# Patient Record
Sex: MALE | Race: WHITE | NOT HISPANIC OR LATINO
[De-identification: names, ages, dates, MRNs, and addresses within clinical notes are randomized per-mention and may not be internally consistent; named-entity substitution may affect disease eponyms.]

---

## 2019-06-12 PROBLEM — Z00.00 ENCOUNTER FOR PREVENTIVE HEALTH EXAMINATION: Status: ACTIVE | Noted: 2019-06-12

## 2019-06-26 ENCOUNTER — APPOINTMENT (OUTPATIENT)
Dept: ORTHOPEDIC SURGERY | Facility: CLINIC | Age: 54
End: 2019-06-26

## 2019-10-02 ENCOUNTER — APPOINTMENT (OUTPATIENT)
Dept: ORTHOPEDIC SURGERY | Facility: CLINIC | Age: 54
End: 2019-10-02
Payer: COMMERCIAL

## 2019-10-02 ENCOUNTER — TRANSCRIPTION ENCOUNTER (OUTPATIENT)
Age: 54
End: 2019-10-02

## 2019-10-02 DIAGNOSIS — M16.0 BILATERAL PRIMARY OSTEOARTHRITIS OF HIP: ICD-10-CM

## 2019-10-02 PROCEDURE — 99204 OFFICE O/P NEW MOD 45 MIN: CPT

## 2019-10-02 PROCEDURE — 73521 X-RAY EXAM HIPS BI 2 VIEWS: CPT

## 2019-10-02 NOTE — DISCUSSION/SUMMARY
[de-identified] : Patient has a clinical exam and history consistent with tight IT band on the right side appeared to be sent physical therapy twice week 5 weeks if pain persists patient does have a history of spinal stenosis and we may investigate this is possibly related to his chronic lumbar issues as well.

## 2019-10-02 NOTE — PHYSICAL EXAM
[de-identified] : Right hip has full passive internal and external rotation. Patient has tenderness in the area of the origin length and insertion of the right IT band. It is also noted that his right quad and VMO is much stronger and has larger size than his left [de-identified] : AP and lateral of both of her showed excellent position of the right hip replacement. No evidence of loosening or lysis where or failure.

## 2021-02-04 ENCOUNTER — RESULT REVIEW (OUTPATIENT)
Age: 56
End: 2021-02-04

## 2021-02-04 ENCOUNTER — APPOINTMENT (OUTPATIENT)
Dept: ORTHOPEDIC SURGERY | Facility: CLINIC | Age: 56
End: 2021-02-04
Payer: COMMERCIAL

## 2021-02-04 ENCOUNTER — OUTPATIENT (OUTPATIENT)
Dept: OUTPATIENT SERVICES | Facility: HOSPITAL | Age: 56
LOS: 1 days | End: 2021-02-04
Payer: COMMERCIAL

## 2021-02-04 VITALS
OXYGEN SATURATION: 97 % | TEMPERATURE: 97.9 F | SYSTOLIC BLOOD PRESSURE: 120 MMHG | HEIGHT: 70 IN | BODY MASS INDEX: 35.22 KG/M2 | HEART RATE: 76 BPM | DIASTOLIC BLOOD PRESSURE: 80 MMHG | WEIGHT: 246 LBS

## 2021-02-04 DIAGNOSIS — Z82.49 FAMILY HISTORY OF ISCHEMIC HEART DISEASE AND OTHER DISEASES OF THE CIRCULATORY SYSTEM: ICD-10-CM

## 2021-02-04 DIAGNOSIS — J30.2 OTHER SEASONAL ALLERGIC RHINITIS: ICD-10-CM

## 2021-02-04 DIAGNOSIS — Z01.818 ENCOUNTER FOR OTHER PREPROCEDURAL EXAMINATION: ICD-10-CM

## 2021-02-04 DIAGNOSIS — T84.010A BROKEN INTERNAL RIGHT HIP PROSTHESIS, INITIAL ENCOUNTER: ICD-10-CM

## 2021-02-04 DIAGNOSIS — I71.9 AORTIC ANEURYSM OF UNSPECIFIED SITE, W/OUT RUPTURE: ICD-10-CM

## 2021-02-04 DIAGNOSIS — N28.89 OTHER SPECIFIED DISORDERS OF KIDNEY AND URETER: ICD-10-CM

## 2021-02-04 DIAGNOSIS — Z82.3 FAMILY HISTORY OF STROKE: ICD-10-CM

## 2021-02-04 DIAGNOSIS — Z78.9 OTHER SPECIFIED HEALTH STATUS: ICD-10-CM

## 2021-02-04 DIAGNOSIS — T85.848A PAIN DUE TO OTHER INTERNAL PROSTHETIC DEVICES, IMPLANTS AND GRAFTS, INITIAL ENCOUNTER: ICD-10-CM

## 2021-02-04 DIAGNOSIS — Z86.69 PERSONAL HISTORY OF OTHER DISEASES OF THE NERVOUS SYSTEM AND SENSE ORGANS: ICD-10-CM

## 2021-02-04 DIAGNOSIS — D49.59 NEOPLASM UNSPECIFIED BEHAVIOR OF OTHER GENITOURINARY ORGAN: ICD-10-CM

## 2021-02-04 DIAGNOSIS — Z87.19 PERSONAL HISTORY OF OTHER DISEASES OF THE DIGESTIVE SYSTEM: ICD-10-CM

## 2021-02-04 LAB
ALBUMIN SERPL ELPH-MCNC: 4.8 G/DL — SIGNIFICANT CHANGE UP (ref 3.3–5)
ALP SERPL-CCNC: 72 U/L — SIGNIFICANT CHANGE UP (ref 40–120)
ALT FLD-CCNC: 21 U/L — SIGNIFICANT CHANGE UP (ref 10–45)
ANION GAP SERPL CALC-SCNC: 16 MMOL/L — SIGNIFICANT CHANGE UP (ref 5–17)
APPEARANCE UR: CLEAR — SIGNIFICANT CHANGE UP
APTT BLD: 36.9 SEC — HIGH (ref 27.5–35.5)
AST SERPL-CCNC: 24 U/L — SIGNIFICANT CHANGE UP (ref 10–40)
BASOPHILS # BLD AUTO: 0.01 K/UL — SIGNIFICANT CHANGE UP (ref 0–0.2)
BASOPHILS NFR BLD AUTO: 0.1 % — SIGNIFICANT CHANGE UP (ref 0–2)
BILIRUB SERPL-MCNC: 0.4 MG/DL — SIGNIFICANT CHANGE UP (ref 0.2–1.2)
BILIRUB UR-MCNC: NEGATIVE — SIGNIFICANT CHANGE UP
BUN SERPL-MCNC: 22 MG/DL — SIGNIFICANT CHANGE UP (ref 7–23)
CALCIUM SERPL-MCNC: 9.7 MG/DL — SIGNIFICANT CHANGE UP (ref 8.4–10.5)
CHLORIDE SERPL-SCNC: 107 MMOL/L — SIGNIFICANT CHANGE UP (ref 96–108)
CO2 SERPL-SCNC: 27 MMOL/L — SIGNIFICANT CHANGE UP (ref 22–31)
COLOR SPEC: YELLOW — SIGNIFICANT CHANGE UP
CREAT SERPL-MCNC: 1.07 MG/DL — SIGNIFICANT CHANGE UP (ref 0.5–1.3)
DIFF PNL FLD: NEGATIVE — SIGNIFICANT CHANGE UP
EOSINOPHIL # BLD AUTO: 0.08 K/UL — SIGNIFICANT CHANGE UP (ref 0–0.5)
EOSINOPHIL NFR BLD AUTO: 1.1 % — SIGNIFICANT CHANGE UP (ref 0–6)
GLUCOSE SERPL-MCNC: 92 MG/DL — SIGNIFICANT CHANGE UP (ref 70–99)
GLUCOSE UR QL: NEGATIVE — SIGNIFICANT CHANGE UP
HCT VFR BLD CALC: 47.2 % — SIGNIFICANT CHANGE UP (ref 39–50)
HGB BLD-MCNC: 14.8 G/DL — SIGNIFICANT CHANGE UP (ref 13–17)
IMM GRANULOCYTES NFR BLD AUTO: 0.1 % — SIGNIFICANT CHANGE UP (ref 0–1.5)
INR BLD: 1.05 — SIGNIFICANT CHANGE UP (ref 0.88–1.16)
KETONES UR-MCNC: ABNORMAL MG/DL
LEUKOCYTE ESTERASE UR-ACNC: NEGATIVE — SIGNIFICANT CHANGE UP
LYMPHOCYTES # BLD AUTO: 1.96 K/UL — SIGNIFICANT CHANGE UP (ref 1–3.3)
LYMPHOCYTES # BLD AUTO: 25.9 % — SIGNIFICANT CHANGE UP (ref 13–44)
MCHC RBC-ENTMCNC: 30.2 PG — SIGNIFICANT CHANGE UP (ref 27–34)
MCHC RBC-ENTMCNC: 31.4 GM/DL — LOW (ref 32–36)
MCV RBC AUTO: 96.3 FL — SIGNIFICANT CHANGE UP (ref 80–100)
MONOCYTES # BLD AUTO: 0.77 K/UL — SIGNIFICANT CHANGE UP (ref 0–0.9)
MONOCYTES NFR BLD AUTO: 10.2 % — SIGNIFICANT CHANGE UP (ref 2–14)
NEUTROPHILS # BLD AUTO: 4.74 K/UL — SIGNIFICANT CHANGE UP (ref 1.8–7.4)
NEUTROPHILS NFR BLD AUTO: 62.6 % — SIGNIFICANT CHANGE UP (ref 43–77)
NITRITE UR-MCNC: NEGATIVE — SIGNIFICANT CHANGE UP
NRBC # BLD: 0 /100 WBCS — SIGNIFICANT CHANGE UP (ref 0–0)
PH UR: 5 — SIGNIFICANT CHANGE UP (ref 5–8)
PLATELET # BLD AUTO: 310 K/UL — SIGNIFICANT CHANGE UP (ref 150–400)
POTASSIUM SERPL-MCNC: 4.9 MMOL/L — SIGNIFICANT CHANGE UP (ref 3.5–5.3)
POTASSIUM SERPL-SCNC: 4.9 MMOL/L — SIGNIFICANT CHANGE UP (ref 3.5–5.3)
PROT SERPL-MCNC: 7.2 G/DL — SIGNIFICANT CHANGE UP (ref 6–8.3)
PROT UR-MCNC: NEGATIVE MG/DL — SIGNIFICANT CHANGE UP
PROTHROM AB SERPL-ACNC: 12.6 SEC — SIGNIFICANT CHANGE UP (ref 10.6–13.6)
RBC # BLD: 4.9 M/UL — SIGNIFICANT CHANGE UP (ref 4.2–5.8)
RBC # FLD: 12.3 % — SIGNIFICANT CHANGE UP (ref 10.3–14.5)
SODIUM SERPL-SCNC: 150 MMOL/L — HIGH (ref 135–145)
SP GR SPEC: >=1.03 — SIGNIFICANT CHANGE UP (ref 1–1.03)
UROBILINOGEN FLD QL: 0.2 E.U./DL — SIGNIFICANT CHANGE UP
WBC # BLD: 7.57 K/UL — SIGNIFICANT CHANGE UP (ref 3.8–10.5)
WBC # FLD AUTO: 7.57 K/UL — SIGNIFICANT CHANGE UP (ref 3.8–10.5)

## 2021-02-04 PROCEDURE — 87086 URINE CULTURE/COLONY COUNT: CPT

## 2021-02-04 PROCEDURE — 99214 OFFICE O/P EST MOD 30 MIN: CPT

## 2021-02-04 PROCEDURE — 99214 OFFICE O/P EST MOD 30 MIN: CPT | Mod: 25

## 2021-02-04 PROCEDURE — 93005 ELECTROCARDIOGRAM TRACING: CPT

## 2021-02-04 PROCEDURE — 99072 ADDL SUPL MATRL&STAF TM PHE: CPT

## 2021-02-04 PROCEDURE — 93010 ELECTROCARDIOGRAM REPORT: CPT

## 2021-02-04 PROCEDURE — 71046 X-RAY EXAM CHEST 2 VIEWS: CPT

## 2021-02-04 PROCEDURE — 80053 COMPREHEN METABOLIC PANEL: CPT

## 2021-02-04 PROCEDURE — 73521 X-RAY EXAM HIPS BI 2 VIEWS: CPT

## 2021-02-04 PROCEDURE — 99024 POSTOP FOLLOW-UP VISIT: CPT

## 2021-02-04 PROCEDURE — 81003 URINALYSIS AUTO W/O SCOPE: CPT

## 2021-02-04 PROCEDURE — 85025 COMPLETE CBC W/AUTO DIFF WBC: CPT

## 2021-02-04 PROCEDURE — 85610 PROTHROMBIN TIME: CPT

## 2021-02-04 PROCEDURE — 71046 X-RAY EXAM CHEST 2 VIEWS: CPT | Mod: 26

## 2021-02-04 PROCEDURE — 85730 THROMBOPLASTIN TIME PARTIAL: CPT

## 2021-02-04 RX ORDER — ACETAMINOPHEN AND CODEINE 300; 30 MG/1; MG/1
300-30 TABLET ORAL 3 TIMES DAILY
Qty: 30 | Refills: 0 | Status: ACTIVE | COMMUNITY
Start: 2021-02-04 | End: 1900-01-01

## 2021-02-04 RX ORDER — ALPRAZOLAM 2 MG/1
TABLET ORAL
Refills: 0 | Status: ACTIVE | COMMUNITY

## 2021-02-04 RX ORDER — GABAPENTIN 300 MG
300 TABLET ORAL
Refills: 0 | Status: ACTIVE | COMMUNITY

## 2021-02-04 RX ORDER — FAMOTIDINE 20 MG/1
20 TABLET, FILM COATED ORAL
Refills: 0 | Status: ACTIVE | COMMUNITY

## 2021-02-04 RX ORDER — LOSARTAN POTASSIUM 50 MG/1
50 TABLET, FILM COATED ORAL
Refills: 0 | Status: ACTIVE | COMMUNITY

## 2021-02-04 NOTE — DISCUSSION/SUMMARY
[de-identified] : Patient had a long discussion today he is clearly had what appears to be a traumatic rupture of the polyethylene. This is quite aware of that have not witnessed such an event in the past 22 years using this implant type. Review of the operative report indicates a +410° polyethylene liner mated to a metal nonceramic head.I discussed these findings and patient care with Dr. Nathanael Wren 3 was a revision specialist. Dr. QUIROZ has decided and agreed to see the patient in consultation in the next day or 2 and we'll potentially plan revision surgery next week.

## 2021-02-04 NOTE — PHYSICAL EXAM
[No Acute Distress] : no acute distress [Well Nourished] : well nourished [Well Developed] : well developed [Well-Appearing] : well-appearing [Normal Sclera/Conjunctiva] : normal sclera/conjunctiva [PERRL] : pupils equal round and reactive to light [EOMI] : extraocular movements intact [Normal Outer Ear/Nose] : the outer ears and nose were normal in appearance [Normal Oropharynx] : the oropharynx was normal [No JVD] : no jugular venous distention [No Lymphadenopathy] : no lymphadenopathy [Supple] : supple [Thyroid Normal, No Nodules] : the thyroid was normal and there were no nodules present [No Respiratory Distress] : no respiratory distress  [No Accessory Muscle Use] : no accessory muscle use [Clear to Auscultation] : lungs were clear to auscultation bilaterally [Normal Rate] : normal rate  [Regular Rhythm] : with a regular rhythm [Normal S1, S2] : normal S1 and S2 [No Murmur] : no murmur heard [No Carotid Bruits] : no carotid bruits [No Abdominal Bruit] : a ~M bruit was not heard ~T in the abdomen [No Varicosities] : no varicosities [Pedal Pulses Present] : the pedal pulses are present [No Edema] : there was no peripheral edema [No Palpable Aorta] : no palpable aorta [No Extremity Clubbing/Cyanosis] : no extremity clubbing/cyanosis [Soft] : abdomen soft [Non Tender] : non-tender [Non-distended] : non-distended [No Masses] : no abdominal mass palpated [No HSM] : no HSM [Normal Bowel Sounds] : normal bowel sounds [Normal Posterior Cervical Nodes] : no posterior cervical lymphadenopathy [Normal Anterior Cervical Nodes] : no anterior cervical lymphadenopathy [No CVA Tenderness] : no CVA  tenderness [No Spinal Tenderness] : no spinal tenderness [Grossly Normal Strength/Tone] : grossly normal strength/tone [No Rash] : no rash [Coordination Grossly Intact] : coordination grossly intact [No Focal Deficits] : no focal deficits [Normal Gait] : normal gait [Deep Tendon Reflexes (DTR)] : deep tendon reflexes were 2+ and symmetric [Normal Affect] : the affect was normal [Normal Insight/Judgement] : insight and judgment were intact

## 2021-02-04 NOTE — HISTORY OF PRESENT ILLNESS
[de-identified] : Patient is here with a new complaint. Patient developed a squeaking noise and sensation his right hip status post fall 3 days ago. Patient prior to that and doing quite well status post may 2015 right hip replacement. Patient had an uncomplicated postop course actually had a repeat radiograph in November of 2020 and ultimately had a diagnosis of trochanteric bursitis/ITB tightness of the right hip. Patient presents today with pain in his thigh and also a squeaking sensation while angulated.

## 2021-02-04 NOTE — PHYSICAL EXAM
[de-identified] : Patient no discernible leg length discrepancy with passive internal/external rotation of the right hip at 90° there is no restriction no audible squeaking today. There is also no audible squeaking with patient ambulating in the office. No sense of grinding with rotation either. He is neurovascularly intact distally. [de-identified] : AP and lateral of the right hip were obtained and compared to radiographs from November 2020 and 2015. There is no interval change since November 2023 teslas shows an acentric femoral head component. This is visible both in the AP and lateral view.

## 2021-02-05 RX ORDER — KETOCONAZOLE 20 MG/G
2 CREAM TOPICAL
Qty: 15 | Refills: 0 | Status: ACTIVE | COMMUNITY
Start: 2020-12-30

## 2021-02-05 RX ORDER — GABAPENTIN 300 MG/1
300 CAPSULE ORAL
Qty: 270 | Refills: 0 | Status: ACTIVE | COMMUNITY
Start: 2021-01-07

## 2021-02-05 RX ORDER — GABAPENTIN 100 MG/1
100 CAPSULE ORAL
Qty: 90 | Refills: 0 | Status: ACTIVE | COMMUNITY
Start: 2020-12-10

## 2021-02-05 RX ORDER — SILDENAFIL 100 MG/1
100 TABLET, FILM COATED ORAL
Qty: 12 | Refills: 0 | Status: ACTIVE | COMMUNITY
Start: 2021-01-26

## 2021-02-05 RX ORDER — ALBUTEROL SULFATE 90 UG/1
108 (90 BASE) INHALANT RESPIRATORY (INHALATION)
Qty: 8 | Refills: 0 | Status: ACTIVE | COMMUNITY
Start: 2021-01-26

## 2021-02-05 RX ORDER — ALPRAZOLAM 1 MG/1
1 TABLET ORAL
Qty: 30 | Refills: 0 | Status: ACTIVE | COMMUNITY
Start: 2021-01-20

## 2021-02-05 RX ORDER — NAPROXEN 500 MG/1
500 TABLET ORAL
Qty: 180 | Refills: 0 | Status: ACTIVE | COMMUNITY
Start: 2021-01-25

## 2021-02-05 NOTE — HISTORY OF PRESENT ILLNESS
[No Pertinent Pulmonary History] : no history of asthma, COPD, sleep apnea, or smoking [No Adverse Anesthesia Reaction] : no adverse anesthesia reaction in self or family member [(Patient denies any chest pain, claudication, dyspnea on exertion, orthopnea, palpitations or syncope)] : Patient denies any chest pain, claudication, dyspnea on exertion, orthopnea, palpitations or syncope [Moderate (4-6 METs)] : Moderate (4-6 METs) [Aortic Stenosis] : no aortic stenosis [Atrial Fibrillation] : no atrial fibrillation [Coronary Artery Disease] : no coronary artery disease [Recent Myocardial Infarction] : no recent myocardial infarction [Implantable Device/Pacemaker] : no implantable device/pacemaker [Chronic Anticoagulation] : no chronic anticoagulation [Chronic Kidney Disease] : no chronic kidney disease [Diabetes] : no diabetes [FreeTextEntry1] : Right Total Hip Revision Arthroplasty [FreeTextEntry2] : 2/8/2021 [FreeTextEntry3] : Dr. Nathanael Wren [FreeTextEntry4] : The patient is a 56 year old man presenting with complications after fall s/p right total hip arthroplasty . The patient is planned for right total hip revision arthroplasty with Dr. Nathanael Wren on 2/8/2021\par \par The patient denies recent/active cardiopulmonary symptoms including chest pain, shortness of breath, dyspnea of exertion, palpitations, swelling, headache, dizziness, syncope. \par \par He has a history of stable, asymptomatic aortic aneurysm, last measured at 4.5cm.\par \par  [FreeTextEntry5] : Neuropathy, Stable Aortic Aneurysm ~4.5cm

## 2021-02-05 NOTE — PHYSICAL EXAM
[de-identified] : General appearance: well nourished and hydrated, pleasant, alert and oriented x 3, cooperative.  Obese habitus, mostly central.\par HEENT: normocephalic, EOM intact, wearing mask, external auditory canal clear.  \par Cardiovascular: no lower leg edema, no varicosities, dorsalis pedis pulses palpable and symmetric.  \par Lymphatics: no palpable lymphadenopathy, no lymphedema.  \par Neurologic: sensation is normal, no muscle weakness in upper or lower extremities, patella tendon reflexes present and symmetric.  \par Dermatologic: skin moist, warm, no rash.  \par Spine: cervical spine with normal lordosis and painless range of motion, thoracic spine with normal kyphosis and painless range of motion, lumbosacral spine with normal lordosis and painless range of motion.\par Gait: ambulating with one crutch, some right coxalgia.\par \par Limb lengths: RLE about 1-2mm longer than LLE\par \par Right hip:\par - Skin intact, healed anterior incision noted\par - Mild swelling\par - Mild generalized hip and proximal thigh tenderness on palpation\par - ROM: hip was able to be ranged from 0-45 flexion, 10/10 ADD/ABD, 15/15 IR/ER; further ROM was not tested. Motion in all planes was painful.\par - Nabil negative\par - Stinchfield deferred\par - Flexor power 4+/5\par - Abductor power 4+/5 [de-identified] : Imaging was reviewed from [facility]. Oct 2019 hip films demonstrate cementless ELIANE in good position without evidence of complication. 2/4/21 films demonstrate sudden appearance of superior eccentric polyethylene wear suggestive of polyethylene fracture vs. extrusion. Femoral and acetabular components appear to be well fixed without evidence of loosening. No periprosthetic fracture is visualized.

## 2021-02-05 NOTE — HISTORY OF PRESENT ILLNESS
[___ days] : [unfilled] day(s) ago [9] : a current pain level of 9/10 [Constant] : ~He/She~ states the symptoms seem to be constant [Rest] : relieved by rest [de-identified] : 57y/o male referred by Dr. 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 He underwent an uncomplicated right ELIANE in 2015 (direct anterior approach) and did well postoperatively; mild thigh pain was present which did not limit his activities of daily living or his ability to work ([employer], [employer]). However, he had a mechanical ground level fall on 2/1/21 and noted sharply increased right hip and thigh pain as well as a squeaking sensation with ambulation. Subsequent followup with Dr. Lin revealed radiographic evidence of acute acetabular polyethylene fracture vs. extrusion. He was referred to me for definitive surgical management. He has been mostly compliant with PWB on crutches since seeing Dr. Lin. Denies any other injuries or new pain since the fall. [Walking] : worsened by walking [de-identified] : describes achy and burning gpain

## 2021-02-05 NOTE — ADDENDUM
[FreeTextEntry1] : Labs/EKG/Imaging screen reviewed.  \par \par EKG with NSR with RBBB.  No evidence of acute ischemic changes.\par \par Chem-7 with serum sodium 150.  Urinalysis with high specific gravity, likely indicating some volume depletion.  Patient instructed on reducing solute intake, increasing free water intake.  He can repeat bloodwork as outpatient with PMD.\par \par PENDING COVID SCREEN, Patient may proceed with scheduled surgery.\par

## 2021-02-05 NOTE — DISCUSSION/SUMMARY
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 I discussed the surgical possibilities with him. At the least, he will require exchange of the head and liner. If the locking mechanism for the acetabular shell has been damaged, he will also need revision of the cup. If access to the cup is impeded by the femoral component, all-component revision may be necessary. He understands. After discussion of the risks, benefits,and alternatives to surgery, he agreed to move forward with surgery.\par - CT right hip to assess for subtle loosening or periprosthetic fracture (given the increased thigh pain)\par - Book for surgery 2/12/21 pending medical clearance

## 2021-02-05 NOTE — ASSESSMENT
[Patient Optimized for Surgery] : Patient optimized for surgery [No Further Testing Recommended] : no further testing recommended [As per surgery] : as per surgery [FreeTextEntry4] : The patient is a 56 year old man presenting with complications after fall s/p right total hip arthroplasty . The patient is planned for right total hip revision arthroplasty with Dr. Nathanael Wren on 2/8/2021\par \par \par -Labs/Diagnostics reviewed with patient in detail\par -METS >4-6\par -RCRI = 0, 3.9% 30-day risk of death/MI/cardiac arrest\par \par Patient is at low risk for moderate risk surgery/procedure\par \par Pending labs/diagnostics, Patient is MEDICALLY OPTIMIZED TO PROCEED WITH PROPOSED SURGERY.\par \par The risks/benefits/potential complications of diagnostic testing/treatments were described in detail.\par \par **Case discussed with patient's primary care physician and specialist(s).\par \par \par

## 2021-02-06 LAB
CULTURE RESULTS: NO GROWTH — SIGNIFICANT CHANGE UP
SPECIMEN SOURCE: SIGNIFICANT CHANGE UP

## 2021-02-08 RX ORDER — ACETAMINOPHEN AND CODEINE 300; 30 MG/1; MG/1
300-30 TABLET ORAL 3 TIMES DAILY
Qty: 30 | Refills: 0 | Status: ACTIVE | COMMUNITY
Start: 2021-02-08 | End: 1900-01-01

## 2021-02-11 ENCOUNTER — TRANSCRIPTION ENCOUNTER (OUTPATIENT)
Age: 56
End: 2021-02-11

## 2021-02-11 VITALS
SYSTOLIC BLOOD PRESSURE: 125 MMHG | RESPIRATION RATE: 16 BRPM | OXYGEN SATURATION: 97 % | TEMPERATURE: 98 F | DIASTOLIC BLOOD PRESSURE: 81 MMHG | WEIGHT: 246.92 LBS | HEIGHT: 70 IN

## 2021-02-11 RX ORDER — GABAPENTIN 400 MG/1
1 CAPSULE ORAL
Qty: 0 | Refills: 0 | DISCHARGE

## 2021-02-11 RX ORDER — POVIDONE-IODINE 5 %
1 AEROSOL (ML) TOPICAL ONCE
Refills: 0 | Status: COMPLETED | OUTPATIENT
Start: 2021-02-12 | End: 2021-02-12

## 2021-02-11 NOTE — H&P ADULT - NSICDXPASTSURGICALHX_GEN_ALL_CORE_FT
PAST SURGICAL HISTORY:  Elective surgery for purposes other than treating health conditions scar revision, neck    Elective surgery for purposes other than treating health conditions tendon transfer    Elective surgery for purposes other than treating health conditions right juglar    Elective surgery for purposes other than treating health conditions nerve graft in arm x2    Elective surgery for purposes other than treating health conditions orchiectomy right    Other postprocedural status S/P rotator cuff surgery    Other postprocedural status multiple surgeries neck, s/p trauma [id number]    Other postprocedural status T3    Other postprocedural status right wrist    Other postprocedural status 1983     PAST SURGICAL HISTORY:  Elective surgery for purposes other than treating health conditions scar revision, neck    Elective surgery for purposes other than treating health conditions tendon transfer    Elective surgery for purposes other than treating health conditions right juglar    Elective surgery for purposes other than treating health conditions nerve graft in arm x2    Elective surgery for purposes other than treating health conditions orchiectomy right    Other postprocedural status S/P rotator cuff surgery, left    Other postprocedural status multiple surgeries neck, s/p trauma [id number]    Other postprocedural status 1983    Other postprocedural status T3, bone taken out    Other postprocedural status right wrist, 2013    Surgery, elective right hip replacement    Surgery, elective hernia repair, right, 2018    Surgery, elective right kidney mass removal, 2014

## 2021-02-11 NOTE — H&P ADULT - PROBLEM SELECTOR PLAN 1
Admit to Orthopaedic Service.  Presents today for elective right total hip revision   Pt medically stable and cleared for procedure today by  Admit to Orthopaedic Service.  Presents today for elective right total hip revision   Pt medically stable and cleared for procedure today by Dr. Davalos

## 2021-02-11 NOTE — H&P ADULT - NSHPLABSRESULTS_GEN_ALL_CORE
covid pcr negative on 2/9/21  Povidone iodine nasal swab to be given day of surgery covid pcr negative on 2/9/21  Povidone iodine nasal swab to be given day of surgery  preop cbc/cmp/pt/inr/ptt - within normal limits, reviewed by medical clearance   Cr 1.07 on 2/4/21   ekg - nsr, RBBB, reviewed by medical clearance  CXR: Within normal limits, per medical clearance.

## 2021-02-11 NOTE — H&P ADULT - NSHPPHYSICALEXAM_GEN_ALL_CORE
MSK:   decreased ROM right hip 2/2 pain  Remainder of physical exam as per medical clearance note General: NAD, alert and oriented  MSK: Right LE incision intact s/p thr, SLT decreased on right lateral region of shin, intact left le. DP/PT 2+, EHL/TA/GS 5/5.   decreased ROM right hip 2/2 pain  Remainder of physical exam as per medical clearance note

## 2021-02-11 NOTE — PRE-OP CHECKLIST - SELECT TESTS ORDERED
urine C&S, Covid negative 2/9/BMP/CBC/PT/PTT/Urinalysis/EKG/CXR urine C&S, Covid negative 2/9/BMP/CBC/PT/PTT/Urinalysis/EKG/CXR/COVID

## 2021-02-11 NOTE — H&P ADULT - HISTORY OF PRESENT ILLNESS
56M with right hip pain x   Presents for elective right total hip revision  56M with right hip pain for several weeks. Pt. had RIGHT THR done with Dr. Lin in 2015. Pt. states after surgery he never felt 100% and was told he had a tight ITband. One week ago, pt. states he doesn't know if he slipped 2/2 to his hip vs jammed his leg and injured hip. Pt. started to hear a squeaky noise in right hip and started to have increased pain. Pt. set up appointment with Dr. Lin, had xrays done which showed "metal on metal and ball is misplaced." Pt. has been using crutches, nwb on right le x 2w. Denies any numbness/tinging in b/l les.   Presents for elective right total hip revision.

## 2021-02-11 NOTE — PATIENT PROFILE ADULT - NSPROIMPLANTSMEDDEV_GEN_A_NUR
right hip prothesis/Prosthetic device(s) right hip prothesis  mesh in right groin, s/p hernia repair/Prosthetic device(s)
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
## 2021-02-12 ENCOUNTER — APPOINTMENT (OUTPATIENT)
Dept: ORTHOPEDIC SURGERY | Facility: HOSPITAL | Age: 56
End: 2021-02-12

## 2021-02-12 ENCOUNTER — TRANSCRIPTION ENCOUNTER (OUTPATIENT)
Age: 56
End: 2021-02-12

## 2021-02-12 ENCOUNTER — RESULT REVIEW (OUTPATIENT)
Age: 56
End: 2021-02-12

## 2021-02-12 ENCOUNTER — INPATIENT (INPATIENT)
Facility: HOSPITAL | Age: 56
LOS: 0 days | Discharge: HOME HEALTH | DRG: 468 | End: 2021-02-13
Attending: ORTHOPAEDIC SURGERY | Admitting: ORTHOPAEDIC SURGERY
Payer: COMMERCIAL

## 2021-02-12 DIAGNOSIS — Z41.9 ENCOUNTER FOR PROCEDURE FOR PURPOSES OTHER THAN REMEDYING HEALTH STATE, UNSPECIFIED: Chronic | ICD-10-CM

## 2021-02-12 DIAGNOSIS — I10 ESSENTIAL (PRIMARY) HYPERTENSION: ICD-10-CM

## 2021-02-12 DIAGNOSIS — M25.551 PAIN IN RIGHT HIP: ICD-10-CM

## 2021-02-12 LAB
GRAM STN FLD: SIGNIFICANT CHANGE UP
SPECIMEN SOURCE: SIGNIFICANT CHANGE UP

## 2021-02-12 PROCEDURE — 72170 X-RAY EXAM OF PELVIS: CPT | Mod: 26

## 2021-02-12 PROCEDURE — 27134 REVISE HIP JOINT REPLACEMENT: CPT | Mod: 52,RT

## 2021-02-12 PROCEDURE — 88305 TISSUE EXAM BY PATHOLOGIST: CPT | Mod: 26

## 2021-02-12 RX ORDER — ALPRAZOLAM 0.25 MG
1 TABLET ORAL THREE TIMES A DAY
Refills: 0 | Status: DISCONTINUED | OUTPATIENT
Start: 2021-02-12 | End: 2021-02-13

## 2021-02-12 RX ORDER — FAMOTIDINE 10 MG/ML
1 INJECTION INTRAVENOUS
Qty: 0 | Refills: 0 | DISCHARGE

## 2021-02-12 RX ORDER — CHLORHEXIDINE GLUCONATE 213 G/1000ML
1 SOLUTION TOPICAL ONCE
Refills: 0 | Status: COMPLETED | OUTPATIENT
Start: 2021-02-12 | End: 2021-02-12

## 2021-02-12 RX ORDER — GABAPENTIN 400 MG/1
1 CAPSULE ORAL
Qty: 0 | Refills: 0 | DISCHARGE

## 2021-02-12 RX ORDER — OXYCODONE HYDROCHLORIDE 5 MG/1
5 TABLET ORAL EVERY 4 HOURS
Refills: 0 | Status: DISCONTINUED | OUTPATIENT
Start: 2021-02-12 | End: 2021-02-13

## 2021-02-12 RX ORDER — HYDROMORPHONE HYDROCHLORIDE 2 MG/ML
0.5 INJECTION INTRAMUSCULAR; INTRAVENOUS; SUBCUTANEOUS
Refills: 0 | Status: DISCONTINUED | OUTPATIENT
Start: 2021-02-12 | End: 2021-02-13

## 2021-02-12 RX ORDER — CEFPODOXIME PROXETIL 100 MG
200 TABLET ORAL EVERY 12 HOURS
Refills: 0 | Status: DISCONTINUED | OUTPATIENT
Start: 2021-02-13 | End: 2021-02-13

## 2021-02-12 RX ORDER — SENNA PLUS 8.6 MG/1
2 TABLET ORAL AT BEDTIME
Refills: 0 | Status: DISCONTINUED | OUTPATIENT
Start: 2021-02-12 | End: 2021-02-13

## 2021-02-12 RX ORDER — ASPIRIN 81 MG/1
81 TABLET ORAL
Qty: 60 | Refills: 0 | Status: ACTIVE | COMMUNITY
Start: 2021-02-12 | End: 1900-01-01

## 2021-02-12 RX ORDER — LOSARTAN POTASSIUM 100 MG/1
1 TABLET, FILM COATED ORAL
Qty: 0 | Refills: 0 | DISCHARGE

## 2021-02-12 RX ORDER — OXYCODONE HYDROCHLORIDE 5 MG/1
10 TABLET ORAL EVERY 4 HOURS
Refills: 0 | Status: DISCONTINUED | OUTPATIENT
Start: 2021-02-12 | End: 2021-02-13

## 2021-02-12 RX ORDER — FOLIC ACID 0.8 MG
1 TABLET ORAL DAILY
Refills: 0 | Status: DISCONTINUED | OUTPATIENT
Start: 2021-02-12 | End: 2021-02-13

## 2021-02-12 RX ORDER — BUPIVACAINE 13.3 MG/ML
20 INJECTION, SUSPENSION, LIPOSOMAL INFILTRATION ONCE
Refills: 0 | Status: DISCONTINUED | OUTPATIENT
Start: 2021-02-12 | End: 2021-02-13

## 2021-02-12 RX ORDER — GABAPENTIN 400 MG/1
300 CAPSULE ORAL DAILY
Refills: 0 | Status: DISCONTINUED | OUTPATIENT
Start: 2021-02-12 | End: 2021-02-13

## 2021-02-12 RX ORDER — MAGNESIUM HYDROXIDE 400 MG/1
30 TABLET, CHEWABLE ORAL DAILY
Refills: 0 | Status: DISCONTINUED | OUTPATIENT
Start: 2021-02-12 | End: 2021-02-13

## 2021-02-12 RX ORDER — CEFAZOLIN SODIUM 1 G
2000 VIAL (EA) INJECTION EVERY 8 HOURS
Refills: 0 | Status: COMPLETED | OUTPATIENT
Start: 2021-02-12 | End: 2021-02-12

## 2021-02-12 RX ORDER — ACETAMINOPHEN 500 MG/1
500 TABLET ORAL
Qty: 180 | Refills: 1 | Status: ACTIVE | COMMUNITY
Start: 2021-02-12 | End: 1900-01-01

## 2021-02-12 RX ORDER — ASPIRIN/CALCIUM CARB/MAGNESIUM 324 MG
81 TABLET ORAL
Refills: 0 | Status: DISCONTINUED | OUTPATIENT
Start: 2021-02-13 | End: 2021-02-13

## 2021-02-12 RX ORDER — CELECOXIB 200 MG/1
400 CAPSULE ORAL ONCE
Refills: 0 | Status: COMPLETED | OUTPATIENT
Start: 2021-02-12 | End: 2021-02-12

## 2021-02-12 RX ORDER — ACETAMINOPHEN 500 MG
975 TABLET ORAL EVERY 8 HOURS
Refills: 0 | Status: DISCONTINUED | OUTPATIENT
Start: 2021-02-12 | End: 2021-02-13

## 2021-02-12 RX ORDER — SODIUM CHLORIDE 9 MG/ML
1000 INJECTION, SOLUTION INTRAVENOUS
Refills: 0 | Status: DISCONTINUED | OUTPATIENT
Start: 2021-02-12 | End: 2021-02-13

## 2021-02-12 RX ORDER — ASPIRIN/CALCIUM CARB/MAGNESIUM 324 MG
81 TABLET ORAL
Refills: 0 | Status: DISCONTINUED | OUTPATIENT
Start: 2021-02-12 | End: 2021-02-12

## 2021-02-12 RX ORDER — FAMOTIDINE 10 MG/ML
40 INJECTION INTRAVENOUS EVERY 12 HOURS
Refills: 0 | Status: DISCONTINUED | OUTPATIENT
Start: 2021-02-12 | End: 2021-02-13

## 2021-02-12 RX ORDER — HYDROMORPHONE HYDROCHLORIDE 2 MG/ML
0.5 INJECTION INTRAMUSCULAR; INTRAVENOUS; SUBCUTANEOUS EVERY 4 HOURS
Refills: 0 | Status: DISCONTINUED | OUTPATIENT
Start: 2021-02-12 | End: 2021-02-13

## 2021-02-12 RX ORDER — GABAPENTIN 400 MG/1
600 CAPSULE ORAL ONCE
Refills: 0 | Status: COMPLETED | OUTPATIENT
Start: 2021-02-12 | End: 2021-02-12

## 2021-02-12 RX ORDER — ONDANSETRON 8 MG/1
4 TABLET, FILM COATED ORAL EVERY 6 HOURS
Refills: 0 | Status: DISCONTINUED | OUTPATIENT
Start: 2021-02-12 | End: 2021-02-13

## 2021-02-12 RX ORDER — ACETAMINOPHEN 500 MG
1000 TABLET ORAL ONCE
Refills: 0 | Status: COMPLETED | OUTPATIENT
Start: 2021-02-12 | End: 2021-02-12

## 2021-02-12 RX ORDER — LOSARTAN POTASSIUM 100 MG/1
50 TABLET, FILM COATED ORAL DAILY
Refills: 0 | Status: DISCONTINUED | OUTPATIENT
Start: 2021-02-12 | End: 2021-02-13

## 2021-02-12 RX ADMIN — Medication 1 APPLICATION(S): at 06:35

## 2021-02-12 RX ADMIN — SENNA PLUS 2 TABLET(S): 8.6 TABLET ORAL at 22:02

## 2021-02-12 RX ADMIN — FAMOTIDINE 40 MILLIGRAM(S): 10 INJECTION INTRAVENOUS at 17:59

## 2021-02-12 RX ADMIN — OXYCODONE HYDROCHLORIDE 10 MILLIGRAM(S): 5 TABLET ORAL at 17:59

## 2021-02-12 RX ADMIN — Medication 100 MILLIGRAM(S): at 23:06

## 2021-02-12 RX ADMIN — HYDROMORPHONE HYDROCHLORIDE 0.5 MILLIGRAM(S): 2 INJECTION INTRAMUSCULAR; INTRAVENOUS; SUBCUTANEOUS at 16:05

## 2021-02-12 RX ADMIN — OXYCODONE HYDROCHLORIDE 10 MILLIGRAM(S): 5 TABLET ORAL at 22:02

## 2021-02-12 RX ADMIN — CELECOXIB 400 MILLIGRAM(S): 200 CAPSULE ORAL at 06:35

## 2021-02-12 RX ADMIN — Medication 1000 MILLIGRAM(S): at 06:34

## 2021-02-12 RX ADMIN — Medication 1 MILLIGRAM(S): at 23:06

## 2021-02-12 RX ADMIN — HYDROMORPHONE HYDROCHLORIDE 0.5 MILLIGRAM(S): 2 INJECTION INTRAMUSCULAR; INTRAVENOUS; SUBCUTANEOUS at 11:48

## 2021-02-12 RX ADMIN — Medication 100 MILLIGRAM(S): at 17:59

## 2021-02-12 RX ADMIN — Medication 975 MILLIGRAM(S): at 16:04

## 2021-02-12 RX ADMIN — CHLORHEXIDINE GLUCONATE 1 APPLICATION(S): 213 SOLUTION TOPICAL at 06:35

## 2021-02-12 RX ADMIN — Medication 975 MILLIGRAM(S): at 22:03

## 2021-02-12 RX ADMIN — HYDROMORPHONE HYDROCHLORIDE 0.5 MILLIGRAM(S): 2 INJECTION INTRAMUSCULAR; INTRAVENOUS; SUBCUTANEOUS at 12:28

## 2021-02-12 RX ADMIN — GABAPENTIN 600 MILLIGRAM(S): 400 CAPSULE ORAL at 06:35

## 2021-02-12 NOTE — PHYSICAL THERAPY INITIAL EVALUATION ADULT - PERTINENT HX OF CURRENT PROBLEM, REHAB EVAL
56M with right hip pain for several weeks. Pt. had RIGHT THR done with Dr. Lin in 2015. Pt. states after surgery he never felt 100% and was told he had a tight ITband. One week ago, pt. states he doesn't know if he slipped 2/2 to his hip vs jammed his leg and injured hip. Pt. started to hear a squeaky noise in right hip and started to have increased pain.

## 2021-02-12 NOTE — DISCHARGE NOTE PROVIDER - NSDCFUADDINST_GEN_ALL_CORE_FT
No strenuous activity, heavy lifting, driving or returning to work until cleared by MD.  You may shower - dressing is water-resistant, no soaking in bathtubs.  Remove dressing after post op day 5-7, then leave incision open to air. Keep incision clean and dry.  Try to have regular bowel movements, take stool softener or laxative if necessary.  May take Pepcid or Zantac for upset stomach.  May take Aleve or Naproxen instead of Meloxicam.  Swelling may travel all the way down leg to foot, this is normal and will subside in a few weeks.  Call to schedule an appt with Dr. Wren for follow up, if you have staples or sutures they will be removed in office.  Contact your doctor if you experience: fever greater than 101.5, chills, chest pain, difficulty breathing, redness or excessive drainage around the incision, other concerns.  MEDICATIONS:  Aspirin 81mg twice daily for 30 days  Cefpodoxime 200mg twice daily for 5 days  Your prescriptions have been sent to [facility] at [facility].    No strenuous activity, heavy lifting, driving or returning to work until cleared by MD.  You may shower - dressing is water-resistant, no soaking in bathtubs.  Remove dressing after post op day 5-7, then leave incision open to air. Keep incision clean and dry.  Try to have regular bowel movements, take stool softener or laxative if necessary.  May take Pepcid or Zantac for upset stomach.  May take Aleve or Naproxen instead of Meloxicam.  Swelling may travel all the way down leg to foot, this is normal and will subside in a few weeks.  Call to schedule an appt with Dr. Wren for follow up, if you have staples or sutures they will be removed in office.  Contact your doctor if you experience: fever greater than 101.5, chills, chest pain, difficulty breathing, redness or excessive drainage around the incision, other concerns.

## 2021-02-12 NOTE — PHYSICAL THERAPY INITIAL EVALUATION ADULT - GENERAL OBSERVATIONS, REHAB EVAL
Patient received semi-sharif in bed in NAD on RA, +SCDs, +Heplock, +abduction pillow, +prevena. Cleared by FERNANDA Mitchell. Agreeable to PT.

## 2021-02-12 NOTE — DISCHARGE NOTE PROVIDER - NSDCMRMEDTOKEN_GEN_ALL_CORE_FT
famotidine 20 mg oral tablet: 1 tab(s) orally 2 times a day  gabapentin 300 mg oral capsule: 1 cap(s) orally once a day  losartan 50 mg oral tablet: 1 tab(s) orally once a day  Naprosyn 500 mg oral tablet: 1 tab(s) orally 2 times a day, As Needed  Percocet 7.5/325 oral tablet: 1 tab(s) orally every 6 hours, As Needed  Xanax 1 mg oral tablet: 1 tab(s) orally 3 times a day   aspirin 81 mg oral delayed release tablet: 1 tab(s) orally 2 times a day  cefpodoxime 200 mg oral tablet: 1 tab(s) orally every 12 hours  famotidine 20 mg oral tablet: 1 tab(s) orally 2 times a day  gabapentin 300 mg oral capsule: 1 cap(s) orally once a day  losartan 50 mg oral tablet: 1 tab(s) orally once a day  Rolling walker:

## 2021-02-12 NOTE — DISCHARGE NOTE PROVIDER - CARE PROVIDER_API CALL
Nghia Reza)  Orthopaedic Surgery Surgery  [address] [place] [address]  Phone: [phone]  Fax: [phone]  Follow Up Time:

## 2021-02-12 NOTE — BRIEF OPERATIVE NOTE - NSICDXBRIEFPROCEDURE_GEN_ALL_CORE_FT
PROCEDURES:  Irrigation and debridement, femur, head, with hip joint polyethylene liner replacement 12-Feb-2021 11:08:44  Red Mcnally

## 2021-02-12 NOTE — DISCHARGE NOTE PROVIDER - NSDCCPTREATMENT_GEN_ALL_CORE_FT
PRINCIPAL PROCEDURE  Procedure: Irrigation and debridement, femur, head, with hip joint polyethylene liner replacement  Findings and Treatment:

## 2021-02-12 NOTE — PHYSICAL THERAPY INITIAL EVALUATION ADULT - ADDITIONAL COMMENTS
Pt lives with his family in a house, about 4 MARG[address] and 2 FOS inside.  Pt denies a recent history of falls besides the admitting fall.  Pt owns SC and crutches.

## 2021-02-12 NOTE — PHYSICAL THERAPY INITIAL EVALUATION ADULT - SHORT TERM MEMORY, REHAB EVAL
Pain management  kenalog cream to area  keflex  F/U with peds urology in 1 to 2 weeks  Case discussed with Dr Jay, discussed with Dr Landeros
intact

## 2021-02-12 NOTE — DISCHARGE NOTE PROVIDER - HOSPITAL COURSE
Admitted  Surgery Revision Right THR  Rachel-op Antibiotics  Pain control  DVT prophylaxis  OOB/Physical Therapy

## 2021-02-12 NOTE — PROGRESS NOTE ADULT - SUBJECTIVE AND OBJECTIVE BOX
Orthopaedics Post Op Check    Procedure: Right revision total hip replacement   Surgeon: Dr. Wren    Pt comfortable, without complaints  Denies CP, SOB, N/V, numbness/tingling     Vital Signs Last 24 Hrs  T(C): 36.7 (12 Feb 2021 13:05), Max: 36.7 (12 Feb 2021 13:05)  T(F): 98 (12 Feb 2021 13:05), Max: 98 (12 Feb 2021 13:05)  HR: 65 (12 Feb 2021 13:05) (62 - 65)  BP: 118/77 (12 Feb 2021 13:05) (115/75 - 146/77)  BP(mean): 94 (12 Feb 2021 13:05) (90 - 105)  RR: 16 (12 Feb 2021 13:05) (13 - 16)  SpO2: 96% (12 Feb 2021 13:05) (95% - 99%)  AVSS, NAD    Dressing C/D/I - prevena in place, holding suction; abduction pillow in place   General: Pt Alert and oriented     Pulses: DP pulses palpable bilaterally   Sensation: SLT in tact to distal bilateral lower extremities; baseline decreased sensation at lateral aspect right foot   Motor: EHL/FHL/TA/GS 5/5 motor strength bilateral lower extremities           Post op XR: right hip prosthesis in place     A/P: 56yMale POD#0 s/p revision right THR   - Stable  - Pain Control  - DVT ppx: ASA, SCDs  - Post op abx: Ancef, Cefpodoxime  - PT, WBS:  WBAT, posterior hip precautions   - F/U AM Labs

## 2021-02-13 ENCOUNTER — TRANSCRIPTION ENCOUNTER (OUTPATIENT)
Age: 56
End: 2021-02-13

## 2021-02-13 VITALS — SYSTOLIC BLOOD PRESSURE: 127 MMHG | HEART RATE: 73 BPM | OXYGEN SATURATION: 98 % | DIASTOLIC BLOOD PRESSURE: 79 MMHG

## 2021-02-13 LAB
ANION GAP SERPL CALC-SCNC: 9 MMOL/L — SIGNIFICANT CHANGE UP (ref 5–17)
BUN SERPL-MCNC: 17 MG/DL — SIGNIFICANT CHANGE UP (ref 7–23)
CALCIUM SERPL-MCNC: 8.2 MG/DL — LOW (ref 8.4–10.5)
CHLORIDE SERPL-SCNC: 102 MMOL/L — SIGNIFICANT CHANGE UP (ref 96–108)
CO2 SERPL-SCNC: 28 MMOL/L — SIGNIFICANT CHANGE UP (ref 22–31)
CREAT SERPL-MCNC: 0.85 MG/DL — SIGNIFICANT CHANGE UP (ref 0.5–1.3)
GLUCOSE SERPL-MCNC: 112 MG/DL — HIGH (ref 70–99)
HCT VFR BLD CALC: 35.7 % — LOW (ref 39–50)
HGB BLD-MCNC: 11.3 G/DL — LOW (ref 13–17)
MCHC RBC-ENTMCNC: 30.5 PG — SIGNIFICANT CHANGE UP (ref 27–34)
MCHC RBC-ENTMCNC: 31.7 GM/DL — LOW (ref 32–36)
MCV RBC AUTO: 96.5 FL — SIGNIFICANT CHANGE UP (ref 80–100)
NRBC # BLD: 0 /100 WBCS — SIGNIFICANT CHANGE UP (ref 0–0)
PLATELET # BLD AUTO: 309 K/UL — SIGNIFICANT CHANGE UP (ref 150–400)
POTASSIUM SERPL-MCNC: 3.9 MMOL/L — SIGNIFICANT CHANGE UP (ref 3.5–5.3)
POTASSIUM SERPL-SCNC: 3.9 MMOL/L — SIGNIFICANT CHANGE UP (ref 3.5–5.3)
RBC # BLD: 3.7 M/UL — LOW (ref 4.2–5.8)
RBC # FLD: 12.2 % — SIGNIFICANT CHANGE UP (ref 10.3–14.5)
SODIUM SERPL-SCNC: 139 MMOL/L — SIGNIFICANT CHANGE UP (ref 135–145)
WBC # BLD: 9.19 K/UL — SIGNIFICANT CHANGE UP (ref 3.8–10.5)
WBC # FLD AUTO: 9.19 K/UL — SIGNIFICANT CHANGE UP (ref 3.8–10.5)

## 2021-02-13 PROCEDURE — 80048 BASIC METABOLIC PNL TOTAL CA: CPT

## 2021-02-13 PROCEDURE — 97530 THERAPEUTIC ACTIVITIES: CPT

## 2021-02-13 PROCEDURE — C1776: CPT

## 2021-02-13 PROCEDURE — 87075 CULTR BACTERIA EXCEPT BLOOD: CPT

## 2021-02-13 PROCEDURE — 97116 GAIT TRAINING THERAPY: CPT

## 2021-02-13 PROCEDURE — 36415 COLL VENOUS BLD VENIPUNCTURE: CPT

## 2021-02-13 PROCEDURE — 85027 COMPLETE CBC AUTOMATED: CPT

## 2021-02-13 PROCEDURE — 88305 TISSUE EXAM BY PATHOLOGIST: CPT

## 2021-02-13 PROCEDURE — 72170 X-RAY EXAM OF PELVIS: CPT

## 2021-02-13 PROCEDURE — 87070 CULTURE OTHR SPECIMN AEROBIC: CPT

## 2021-02-13 RX ORDER — ALPRAZOLAM 0.25 MG
1 TABLET ORAL
Qty: 0 | Refills: 0 | DISCHARGE

## 2021-02-13 RX ORDER — CEFPODOXIME PROXETIL 100 MG
1 TABLET ORAL
Qty: 10 | Refills: 0
Start: 2021-02-13 | End: 2021-02-17

## 2021-02-13 RX ORDER — ASPIRIN/CALCIUM CARB/MAGNESIUM 324 MG
1 TABLET ORAL
Qty: 0 | Refills: 0 | DISCHARGE
Start: 2021-02-13

## 2021-02-13 RX ORDER — ALPRAZOLAM 0.25 MG
1 TABLET ORAL THREE TIMES A DAY
Refills: 0 | Status: DISCONTINUED | OUTPATIENT
Start: 2021-02-13 | End: 2021-02-13

## 2021-02-13 RX ORDER — CEFPODOXIME PROXETIL 100 MG
1 TABLET ORAL
Qty: 0 | Refills: 0 | DISCHARGE
Start: 2021-02-13

## 2021-02-13 RX ADMIN — Medication 975 MILLIGRAM(S): at 14:00

## 2021-02-13 RX ADMIN — Medication 200 MILLIGRAM(S): at 06:07

## 2021-02-13 RX ADMIN — Medication 81 MILLIGRAM(S): at 06:07

## 2021-02-13 RX ADMIN — OXYCODONE HYDROCHLORIDE 10 MILLIGRAM(S): 5 TABLET ORAL at 10:46

## 2021-02-13 RX ADMIN — FAMOTIDINE 40 MILLIGRAM(S): 10 INJECTION INTRAVENOUS at 06:07

## 2021-02-13 RX ADMIN — OXYCODONE HYDROCHLORIDE 10 MILLIGRAM(S): 5 TABLET ORAL at 02:22

## 2021-02-13 RX ADMIN — OXYCODONE HYDROCHLORIDE 10 MILLIGRAM(S): 5 TABLET ORAL at 14:50

## 2021-02-13 RX ADMIN — Medication 975 MILLIGRAM(S): at 06:07

## 2021-02-13 NOTE — DISCHARGE NOTE NURSING/CASE MANAGEMENT/SOCIAL WORK - PATIENT PORTAL LINK FT
You can access the FollowMyHealth Patient Portal offered by [facility] by registering at the following website: [email or web address]. By joining "[employer]"’s FollowMyHealth portal, you will also be able to view your health information using other applications (apps) compatible with our system.

## 2021-02-16 PROBLEM — M19.90 UNSPECIFIED OSTEOARTHRITIS, UNSPECIFIED SITE: Chronic | Status: ACTIVE | Noted: 2021-02-11

## 2021-02-16 PROBLEM — J30.2 OTHER SEASONAL ALLERGIC RHINITIS: Chronic | Status: ACTIVE | Noted: 2021-02-11
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
## 2021-02-16 PROBLEM — D49.59 NEOPLASM OF UNSPECIFIED BEHAVIOR OF OTHER GENITOURINARY ORGAN: Chronic | Status: ACTIVE | Noted: 2021-02-11

## 2021-02-16 PROBLEM — I71.9 AORTIC ANEURYSM OF UNSPECIFIED SITE, WITHOUT RUPTURE: Chronic | Status: ACTIVE | Noted: 2021-02-11

## 2021-02-16 PROBLEM — K21.9 GASTRO-ESOPHAGEAL REFLUX DISEASE WITHOUT ESOPHAGITIS: Chronic | Status: ACTIVE | Noted: 2021-02-11

## 2021-02-16 PROBLEM — N28.89 OTHER SPECIFIED DISORDERS OF KIDNEY AND URETER: Chronic | Status: ACTIVE | Noted: 2021-02-11
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
## 2021-02-16 PROBLEM — G62.9 POLYNEUROPATHY, UNSPECIFIED: Chronic | Status: ACTIVE | Noted: 2021-02-11

## 2021-02-18 LAB — SURGICAL PATHOLOGY STUDY: SIGNIFICANT CHANGE UP
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
## 2021-02-19 DIAGNOSIS — Z88.0 ALLERGY STATUS TO PENICILLIN: ICD-10-CM

## 2021-02-19 DIAGNOSIS — T84.030A MECHANICAL LOOSENING OF INTERNAL RIGHT HIP PROSTHETIC JOINT, INITIAL ENCOUNTER: ICD-10-CM

## 2021-02-19 DIAGNOSIS — K21.9 GASTRO-ESOPHAGEAL REFLUX DISEASE WITHOUT ESOPHAGITIS: ICD-10-CM

## 2021-02-19 DIAGNOSIS — M25.551 PAIN IN RIGHT HIP: ICD-10-CM

## 2021-02-19 DIAGNOSIS — E66.9 OBESITY, UNSPECIFIED: ICD-10-CM

## 2021-02-19 DIAGNOSIS — I10 ESSENTIAL (PRIMARY) HYPERTENSION: ICD-10-CM
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
## 2021-02-19 DIAGNOSIS — M19.90 UNSPECIFIED OSTEOARTHRITIS, UNSPECIFIED SITE: ICD-10-CM

## 2021-02-25 LAB
CULTURE RESULTS: NO GROWTH — SIGNIFICANT CHANGE UP
CULTURE RESULTS: SIGNIFICANT CHANGE UP
SPECIMEN SOURCE: SIGNIFICANT CHANGE UP

## 2021-03-01 ENCOUNTER — APPOINTMENT (OUTPATIENT)
Dept: ORTHOPEDIC SURGERY | Facility: CLINIC | Age: 56
End: 2021-03-01
Payer: COMMERCIAL

## 2021-03-01 VITALS
DIASTOLIC BLOOD PRESSURE: 80 MMHG | OXYGEN SATURATION: 96 % | SYSTOLIC BLOOD PRESSURE: 140 MMHG | HEART RATE: 82 BPM | HEIGHT: 70 IN | WEIGHT: 246 LBS | BODY MASS INDEX: 35.22 KG/M2

## 2021-03-01 PROCEDURE — 99024 POSTOP FOLLOW-UP VISIT: CPT

## 2021-03-01 NOTE — HISTORY OF PRESENT ILLNESS
[Neuro Intact] : an unremarkable neurological exam [Vascular Intact] : ~T peripheral vascular exam normal [Negative Nany's] : maneuvers demonstrated a negative Nany's sign [de-identified] : First post op visit for right hip revision of femoral head and acetabular polyethylene liner, surgical date 2/12/21 [de-identified] : Doing well. Pain improving. Using cane. Participating in home PT. No incision problems. Compliant with ASA, posterior precautions. No specific complaints. [de-identified] : Right hip posterior incision healed with minimal scabbing, benign in appearance. Ambulating with minimal right antalgia, able to demonstrate gait without cane.  [de-identified] : 55y/o male 2.5wk s/p R hip synovectomy, head and liner exchange for traumatic polyethylene extrusion [de-identified] : Transition to outpatient PT\par Cont posterior precautions for 4 weeks\par Finish out month of ASA\par RTC 4wk with new right hip XRs\par Work forms filled out; he would like to return to full duty in about 6 weeks from now

## 2021-03-03 ENCOUNTER — APPOINTMENT (OUTPATIENT)
Dept: ORTHOPEDIC SURGERY | Facility: CLINIC | Age: 56
End: 2021-03-03
Payer: COMMERCIAL

## 2021-03-03 PROCEDURE — ZZZZZ: CPT

## 2021-03-05 LAB — CULTURE RESULTS: NO GROWTH — SIGNIFICANT CHANGE UP

## 2021-03-22 ENCOUNTER — APPOINTMENT (OUTPATIENT)
Dept: ORTHOPEDIC SURGERY | Facility: CLINIC | Age: 56
End: 2021-03-22
Payer: COMMERCIAL

## 2021-03-22 ENCOUNTER — RESULT REVIEW (OUTPATIENT)
Age: 56
End: 2021-03-22

## 2021-03-22 ENCOUNTER — OUTPATIENT (OUTPATIENT)
Dept: OUTPATIENT SERVICES | Facility: HOSPITAL | Age: 56
LOS: 1 days | End: 2021-03-22
Payer: COMMERCIAL

## 2021-03-22 VITALS — OXYGEN SATURATION: 98 % | HEART RATE: 77 BPM | SYSTOLIC BLOOD PRESSURE: 124 MMHG | DIASTOLIC BLOOD PRESSURE: 80 MMHG

## 2021-03-22 DIAGNOSIS — Z41.9 ENCOUNTER FOR PROCEDURE FOR PURPOSES OTHER THAN REMEDYING HEALTH STATE, UNSPECIFIED: Chronic | ICD-10-CM

## 2021-03-22 PROCEDURE — 99024 POSTOP FOLLOW-UP VISIT: CPT

## 2021-03-22 PROCEDURE — 73501 X-RAY EXAM HIP UNI 1 VIEW: CPT

## 2021-03-22 PROCEDURE — 73501 X-RAY EXAM HIP UNI 1 VIEW: CPT | Mod: 26,RT

## 2021-03-22 NOTE — HISTORY OF PRESENT ILLNESS
[de-identified] : right hip revision of femoral head and acetabular polyethylene liner, surgical date 2/12/21.  [de-identified] : Had a mechanical trip and fall at the start of last week, landing hard on both hands and knees. Initially had no new symptoms but about four days later (this past Friday) noted a sensation of something popping at the lateral aspect of the right hip. This sensation has diminished and he cannot reproduce it now. Thigh pain is about the same as prior, no groin pain. Still walking without assistive device. Still doing PT, feels he's making progress. Takes Tylenol and occasional oxycodone for PT; needs more oxys. Would like to get back to work by April 12th. [de-identified] : R hip: incision healed, benign appearing, nontender. ROM 0-110 flexion, 15 ADD, 40 ABD, 15 IR, 45 ER. No pain or clicking on LAUREN/FADIR. Good hip flexion and abduction strength. No elicitable fascia jorge luis clicking/crepitus. Ambulating with normal gait pattern, no antalgia. [de-identified] : Right hip imaging taken today demonstrates stable position of the components. No fracture/osteolysis/loosening. Normal appearance of the acetabular polyethylene. [de-identified] : 57y/o male about 6wk s/p right hip head and liner exchange, synovectomy for traumatic liner extrusion [de-identified] : Reassurance given\par Cont PT\par Refill oxy; precautions reviewed\par Letter given for work\par RTC 6wk, new right hip and pelvic XRs

## 2021-05-03 ENCOUNTER — APPOINTMENT (OUTPATIENT)
Dept: ORTHOPEDIC SURGERY | Facility: CLINIC | Age: 56
End: 2021-05-03
Payer: COMMERCIAL

## 2021-05-03 PROCEDURE — 99024 POSTOP FOLLOW-UP VISIT: CPT

## 2021-05-04 NOTE — HISTORY OF PRESENT ILLNESS
[de-identified] : s/p right hip revision of femoral head and acetabular polyethylene liner, surgical date 2/12/21. [de-identified] : Doing better. Pain is gradually resolving, well controlled with OTC meds. Back to full function, working full time. No further falls or other mishaps.  [de-identified] : R hip incision healed, benign. ROM 0-90 flex, 10 ADD, 30 ABD, 20 IR, 50 ER. Good quad and hip abductor strength. Ambulating without assistive device, stiff at startup but with minimal limp after a couple of steps, normal stride length and carlos manuel. [de-identified] : Cont PT/HEP\par Weight loss encouraged\par RTC Feb 2022 with right hip and pelvis XRs [de-identified] : 57y/o male about 2.5mo s/p R hip head and liner exchange for traumatic extrusion

## 2021-05-24 ENCOUNTER — TRANSCRIPTION ENCOUNTER (OUTPATIENT)
Age: 56
End: 2021-05-24

## 2021-07-09 ENCOUNTER — TRANSCRIPTION ENCOUNTER (OUTPATIENT)
Age: 56
End: 2021-07-09

## 2021-08-02 ENCOUNTER — RX RENEWAL (OUTPATIENT)
Age: 56
End: 2021-08-02

## 2021-08-02 RX ORDER — ACETAMINOPHEN 500 MG/1
500 TABLET ORAL
Qty: 180 | Refills: 1 | Status: ACTIVE | COMMUNITY
Start: 2021-02-26 | End: 1900-01-01

## 2022-02-28 ENCOUNTER — OUTPATIENT (OUTPATIENT)
Dept: OUTPATIENT SERVICES | Facility: HOSPITAL | Age: 57
LOS: 1 days | End: 2022-02-28
Payer: COMMERCIAL

## 2022-02-28 ENCOUNTER — RESULT REVIEW (OUTPATIENT)
Age: 57
End: 2022-02-28

## 2022-02-28 ENCOUNTER — APPOINTMENT (OUTPATIENT)
Dept: ORTHOPEDIC SURGERY | Facility: CLINIC | Age: 57
End: 2022-02-28
Payer: COMMERCIAL

## 2022-02-28 VITALS
HEIGHT: 70 IN | SYSTOLIC BLOOD PRESSURE: 138 MMHG | OXYGEN SATURATION: 97 % | BODY MASS INDEX: 35.07 KG/M2 | DIASTOLIC BLOOD PRESSURE: 95 MMHG | HEART RATE: 66 BPM | WEIGHT: 245 LBS

## 2022-02-28 DIAGNOSIS — Z41.9 ENCOUNTER FOR PROCEDURE FOR PURPOSES OTHER THAN REMEDYING HEALTH STATE, UNSPECIFIED: Chronic | ICD-10-CM

## 2022-02-28 PROCEDURE — 73502 X-RAY EXAM HIP UNI 2-3 VIEWS: CPT | Mod: 26,RT

## 2022-02-28 PROCEDURE — 99213 OFFICE O/P EST LOW 20 MIN: CPT

## 2022-02-28 PROCEDURE — 73502 X-RAY EXAM HIP UNI 2-3 VIEWS: CPT

## 2022-02-28 RX ORDER — MORPHINE SULFATE 15 MG/1
15 TABLET, FILM COATED, EXTENDED RELEASE ORAL
Qty: 28 | Refills: 0 | Status: DISCONTINUED | COMMUNITY
Start: 2021-02-12 | End: 2022-02-28

## 2022-02-28 RX ORDER — CYCLOBENZAPRINE HYDROCHLORIDE 5 MG/1
5 TABLET, FILM COATED ORAL
Qty: 9 | Refills: 0 | Status: ACTIVE | COMMUNITY
Start: 2021-12-08

## 2022-02-28 RX ORDER — CEFPODOXIME PROXETIL 200 MG/1
200 TABLET, FILM COATED ORAL
Qty: 10 | Refills: 0 | Status: DISCONTINUED | COMMUNITY
Start: 2021-02-12 | End: 2022-02-28

## 2022-02-28 RX ORDER — OXYCODONE 5 MG/1
5 TABLET ORAL
Qty: 50 | Refills: 0 | Status: DISCONTINUED | COMMUNITY
Start: 2021-03-22 | End: 2022-02-28

## 2022-02-28 RX ORDER — PANTOPRAZOLE 40 MG/1
40 TABLET, DELAYED RELEASE ORAL DAILY
Qty: 30 | Refills: 2 | Status: DISCONTINUED | COMMUNITY
Start: 2021-02-12 | End: 2022-02-28

## 2022-02-28 RX ORDER — CLINDAMYCIN HYDROCHLORIDE 300 MG/1
300 CAPSULE ORAL
Qty: 3 | Refills: 0 | Status: DISCONTINUED | COMMUNITY
Start: 2021-03-01 | End: 2022-02-28

## 2022-02-28 RX ORDER — OXYCODONE 5 MG/1
5 TABLET ORAL
Qty: 50 | Refills: 0 | Status: DISCONTINUED | COMMUNITY
Start: 2021-02-12 | End: 2022-02-28

## 2022-02-28 RX ORDER — OXYCODONE AND ACETAMINOPHEN 7.5; 325 MG/1; MG/1
7.5-325 TABLET ORAL
Qty: 40 | Refills: 0 | Status: DISCONTINUED | COMMUNITY
Start: 2021-02-08 | End: 2022-02-28

## 2022-02-28 RX ORDER — OXYCODONE 5 MG/1
5 TABLET ORAL
Qty: 50 | Refills: 0 | Status: DISCONTINUED | COMMUNITY
Start: 2021-02-26 | End: 2022-02-28

## 2022-02-28 NOTE — HISTORY OF PRESENT ILLNESS
[de-identified] : Right hip revision of femoral head and acetabular polyethylene liner, surgical date 2/12/21.\par \par 2/28/22: Doing well. Mild lateral thigh pain that doesn't stop him from working or other normal activities. Has been trying to lose weight, down about 5lbs in the past 2 months. Had a trip and fall 2 months ago with a mild concussion for which he is following up with his PMD; denies hip trauma at that time.\par \par 2/4/21: 55y/o male referred by Dr. 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 He underwent an uncomplicated right ELIANE in 2015 (direct anterior approach) and did well postoperatively; mild thigh pain was present which did not limit his activities of daily living or his ability to work ([employer], [employer]). However, he had a mechanical ground level fall on 2/1/21 and noted sharply increased right hip and thigh pain as well as a squeaking sensation with ambulation. Subsequent followup with Dr. Lin revealed radiographic evidence of acute acetabular polyethylene fracture vs. extrusion. He was referred to me for definitive surgical management. He has been mostly compliant with PWB on crutches since seeing Dr. Lin. Denies any other injuries or new pain since the fall.

## 2022-02-28 NOTE — PHYSICAL EXAM
[de-identified] : General appearance: well nourished and hydrated, pleasant, alert and oriented x 3, cooperative.  Obese habitus, mostly central.\par HEENT: normocephalic, EOM intact, wearing mask, external auditory canal clear.  \par Cardiovascular: no lower leg edema, no varicosities, dorsalis pedis pulses palpable and symmetric.  \par Lymphatics: no palpable lymphadenopathy, no lymphedema.  \par Neurologic: sensation is normal, no muscle weakness in upper or lower extremities, patella tendon reflexes present and symmetric.  \par Dermatologic: skin moist, warm, no rash.  \par Spine: cervical spine with normal lordosis and painless range of motion, thoracic spine with normal kyphosis and painless range of motion, lumbosacral spine with normal lordosis and painless range of motion.\par Gait: normal, no assistive device.\par \par Limb lengths: similar\par \par Right hip:\par - Skin intact, healed anterior and posterolateral incisions noted\par - Mild lateral swelling\par - Mild lateral thigh tenderness\par - ROM: 0-90 flexion, 10/30 ADD/ABD, 15/40 IR/ER\par - Nabil negative\par - [facility] uncomfortable\par - Flexor power 5/5\par - Abductor power 5/5 [de-identified] : Right hip XRs taken today demonstrate stable position of the R ELIANE components without evidence of mechanical complication.

## 2022-02-28 NOTE — DISCUSSION/SUMMARY
[de-identified] : 56y/o male 1 year s/p revision R ELIANE for acetabular liner extrusion, with ongoing mild ITB pain\par - Cont HEP with emphasis on hamstring and ITB stretching\par - Cont weight loss\par - RTC annually with repeat right hip XRs or earlier as needed

## 2022-03-27 ENCOUNTER — RX RENEWAL (OUTPATIENT)
Age: 57
End: 2022-03-27

## 2022-03-27 RX ORDER — ACETAMINOPHEN EXTRA STRENGTH 500 MG/1
500 TABLET ORAL
Qty: 180 | Refills: 2 | Status: ACTIVE | COMMUNITY
Start: 2022-03-27 | End: 1900-01-01

## 2023-03-15 ENCOUNTER — RESULT REVIEW (OUTPATIENT)
Age: 58
End: 2023-03-15

## 2023-03-15 ENCOUNTER — OUTPATIENT (OUTPATIENT)
Dept: OUTPATIENT SERVICES | Facility: HOSPITAL | Age: 58
LOS: 1 days | End: 2023-03-15
Payer: COMMERCIAL

## 2023-03-15 ENCOUNTER — APPOINTMENT (OUTPATIENT)
Dept: ORTHOPEDIC SURGERY | Facility: CLINIC | Age: 58
End: 2023-03-15
Payer: COMMERCIAL

## 2023-03-15 VITALS
HEIGHT: 70 IN | HEART RATE: 55 BPM | DIASTOLIC BLOOD PRESSURE: 84 MMHG | WEIGHT: 245 LBS | BODY MASS INDEX: 35.07 KG/M2 | SYSTOLIC BLOOD PRESSURE: 127 MMHG | OXYGEN SATURATION: 97 %

## 2023-03-15 DIAGNOSIS — M54.16 RADICULOPATHY, LUMBAR REGION: ICD-10-CM

## 2023-03-15 DIAGNOSIS — Z41.9 ENCOUNTER FOR PROCEDURE FOR PURPOSES OTHER THAN REMEDYING HEALTH STATE, UNSPECIFIED: Chronic | ICD-10-CM

## 2023-03-15 PROCEDURE — 99214 OFFICE O/P EST MOD 30 MIN: CPT

## 2023-03-15 PROCEDURE — 73502 X-RAY EXAM HIP UNI 2-3 VIEWS: CPT | Mod: 26,RT

## 2023-03-15 PROCEDURE — 72020 X-RAY EXAM OF SPINE 1 VIEW: CPT

## 2023-03-15 PROCEDURE — 72020 X-RAY EXAM OF SPINE 1 VIEW: CPT | Mod: 26

## 2023-03-15 PROCEDURE — 73502 X-RAY EXAM HIP UNI 2-3 VIEWS: CPT

## 2023-03-16 NOTE — END OF VISIT
[FreeTextEntry3] : All medical record entries made by the Scribe were at my, Dr. Nathanael Wren, direction and personally dictated by me on 03/15/2023. I have reviewed the chart and agree that the record accurately reflects my personal performance of the history, physical exam, assessment and plan. I have also personally directed, reviewed, and agreed with the chart.

## 2023-03-16 NOTE — HISTORY OF PRESENT ILLNESS
[de-identified] : Right hip revision of femoral head and acetabular polyethylene liner, surgical date 2/12/21.\par \par 03/15/2023: 59 y/o male f/u on 2nd annual f/u s/p right hip head and liner exchange for extruded polyethylene liner performed on 2/12/21. He reports that in the time since our last meeting last year, he has had no further major injuries. He has lost 45 lbs via diet and exercise. He keeps himself active and is still working. He does continue to complain of radiating right lateral and posterolateral thigh pain which is somewhat worsened by changes in the weather and seasons, though he does not feel that this limits him in his day to day activities and typically does not require pain medication. He ambulates without the use of an assistive device and has no new complaints today. \par \par 2/28/22: Doing well. Mild lateral thigh pain that doesn't stop him from working or other normal activities. Has been trying to lose weight, down about 5lbs in the past 2 months. Had a trip and fall 2 months ago with a mild concussion for which he is following up with his PMD; denies hip trauma at that time.\par \par 2/4/21: 57y/o male referred by Dr. 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 He underwent an uncomplicated right ELIANE in 2015 (direct anterior approach) and did well postoperatively; mild thigh pain was present which did not limit his activities of daily living or his ability to work ([employer], [employer]). However, he had a mechanical ground level fall on 2/1/21 and noted sharply increased right hip and thigh pain as well as a squeaking sensation with ambulation. Subsequent followup with Dr. Lin revealed radiographic evidence of acute acetabular polyethylene fracture vs. extrusion. He was referred to me for definitive surgical management. He has been mostly compliant with PWB on crutches since seeing Dr. Lin. Denies any other injuries or new pain since the fall.

## 2023-03-16 NOTE — PHYSICAL EXAM
[de-identified] : General appearance: well nourished and hydrated, pleasant, alert and oriented x 3, cooperative. \par HEENT: normocephalic, EOM intact, wearing mask, external auditory canal clear.  \par Cardiovascular: no lower leg edema, no varicosities, dorsalis pedis pulses palpable and symmetric.  \par Lymphatics: no palpable lymphadenopathy, no lymphedema.  \par Neurologic: sensation is normal, no muscle weakness in upper or lower extremities, patella tendon reflexes present and symmetric.  \par Dermatologic: skin moist, warm, no rash.  \par Spine: cervical spine with normal lordosis and painless range of motion, thoracic spine with normal kyphosis and painless range of motion, lumbosacral spine with normal lordosis and painless range of motion.  No tenderness to palpation along midline spine and paraspinal musculature.  Sacroiliac joints nontender bilaterally. Negative SLR and crossed SLR tests bilaterally.\par Gait: no assistive device, stable nonantalgic gait pattern \par \par Limb lengths: clinically equal\par \par Right hip:\par - Focal soft tissue swelling: none\par - Ecchymosis: none\par - Erythema: none\par - Wounds: well healed posterolateral incision, benign appearing \par - Tenderness: mild lateral trochanteric TTP, no TTP in the distal aspect of the IT band. No TTP anteriorly.\par - ROM: \par   - Flexion: 120\par   - Extension: 0\par   - Adduction: 15\par   - Abduction: 40\par   - Internal rotation in 90 degrees of hip flexion: 30\par   - External rotation in 90 degrees of hip flexion: 45\par - LAUREN: negative\par - FADIR: elicits lateral hip pain\par - Nabil: negative\par - Stinchfield: negative\par - Flexor power: 5/5\par - Abductor power: 5/5 [de-identified] : AP pelvis and right hip radiographs were obtained today which demonstrate stable position of the right ELIANE without evidence of mechanical complication as compared to previous imaging. Stable appearance noted as well of normal left hip morphology without evidence of arthritis or osteonecrosis.

## 2023-03-16 NOTE — DISCUSSION/SUMMARY
[de-identified] : 59 y/o male 2 years s/p revision right ELIANE doing well with symptoms of residual IT band syndrome and possible right lumbar radiculopathy. \par - We will investigate the lumbar radiculopathy question with a lumbar MRI noncontrast. We will call him back when the results become available with any further recommendations. \par - Otherwise, I did recommend that he continue with an active HEP and continue with gradual weight loss via diet and exercise. \par - F/u annually with repeat right hip XR or earlier as needed.

## 2023-03-23 DIAGNOSIS — M51.37 OTHER INTERVERTEBRAL DISC DEGENERATION, LUMBOSACRAL REGION: ICD-10-CM

## 2023-03-23 DIAGNOSIS — M48.061 SPINAL STENOSIS, LUMBAR REGION WITHOUT NEUROGENIC CLAUDICATION: ICD-10-CM

## 2023-03-23 DIAGNOSIS — M47.816 SPONDYLOSIS WITHOUT MYELOPATHY OR RADICULOPATHY, LUMBAR REGION: ICD-10-CM

## 2023-03-23 DIAGNOSIS — M47.817 SPONDYLOSIS WITHOUT MYELOPATHY OR RADICULOPATHY, LUMBOSACRAL REGION: ICD-10-CM

## 2023-03-23 DIAGNOSIS — Z47.1 AFTERCARE FOLLOWING JOINT REPLACEMENT SURGERY: ICD-10-CM

## 2023-03-23 DIAGNOSIS — Z96.641 PRESENCE OF RIGHT ARTIFICIAL HIP JOINT: ICD-10-CM

## 2023-03-27 ENCOUNTER — OUTPATIENT (OUTPATIENT)
Dept: OUTPATIENT SERVICES | Facility: HOSPITAL | Age: 58
LOS: 1 days | End: 2023-03-27

## 2023-03-27 ENCOUNTER — APPOINTMENT (OUTPATIENT)
Dept: MRI IMAGING | Facility: CLINIC | Age: 58
End: 2023-03-27
Payer: COMMERCIAL

## 2023-03-27 DIAGNOSIS — Z41.9 ENCOUNTER FOR PROCEDURE FOR PURPOSES OTHER THAN REMEDYING HEALTH STATE, UNSPECIFIED: Chronic | ICD-10-CM

## 2023-03-27 PROCEDURE — 72148 MRI LUMBAR SPINE W/O DYE: CPT | Mod: 26

## 2023-03-29 ENCOUNTER — NON-APPOINTMENT (OUTPATIENT)
Age: 58
End: 2023-03-29

## 2023-10-04 ENCOUNTER — TRANSCRIPTION ENCOUNTER (OUTPATIENT)
Age: 58
End: 2023-10-04

## 2023-10-04 RX ORDER — AMOXICILLIN 500 MG/1
500 TABLET, FILM COATED ORAL
Qty: 4 | Refills: 1 | Status: ACTIVE | COMMUNITY
Start: 2023-10-04 | End: 1900-01-01

## 2023-10-05 ENCOUNTER — RX RENEWAL (OUTPATIENT)
Age: 58
End: 2023-10-05

## 2023-10-05 RX ORDER — DOXYCYCLINE HYCLATE 100 MG/1
100 CAPSULE ORAL
Qty: 1 | Refills: 4 | Status: ACTIVE | COMMUNITY
Start: 2022-06-01 | End: 1900-01-01

## 2024-03-22 ENCOUNTER — RESULT REVIEW (OUTPATIENT)
Age: 59
End: 2024-03-22

## 2024-03-22 ENCOUNTER — OUTPATIENT (OUTPATIENT)
Dept: OUTPATIENT SERVICES | Facility: HOSPITAL | Age: 59
LOS: 1 days | End: 2024-03-22
Payer: COMMERCIAL

## 2024-03-22 ENCOUNTER — APPOINTMENT (OUTPATIENT)
Dept: ORTHOPEDIC SURGERY | Facility: CLINIC | Age: 59
End: 2024-03-22
Payer: COMMERCIAL

## 2024-03-22 VITALS
WEIGHT: 245 LBS | DIASTOLIC BLOOD PRESSURE: 81 MMHG | BODY MASS INDEX: 35.07 KG/M2 | HEART RATE: 55 BPM | OXYGEN SATURATION: 97 % | SYSTOLIC BLOOD PRESSURE: 116 MMHG | HEIGHT: 70 IN

## 2024-03-22 DIAGNOSIS — Z47.1 AFTERCARE FOLLOWING JOINT REPLACEMENT SURGERY: ICD-10-CM

## 2024-03-22 DIAGNOSIS — Z41.9 ENCOUNTER FOR PROCEDURE FOR PURPOSES OTHER THAN REMEDYING HEALTH STATE, UNSPECIFIED: Chronic | ICD-10-CM

## 2024-03-22 DIAGNOSIS — Z96.641 AFTERCARE FOLLOWING JOINT REPLACEMENT SURGERY: ICD-10-CM

## 2024-03-22 PROCEDURE — 99213 OFFICE O/P EST LOW 20 MIN: CPT

## 2024-03-22 PROCEDURE — 73502 X-RAY EXAM HIP UNI 2-3 VIEWS: CPT

## 2024-03-22 PROCEDURE — 73502 X-RAY EXAM HIP UNI 2-3 VIEWS: CPT | Mod: 26,RT

## 2024-03-24 NOTE — DISCUSSION/SUMMARY
[de-identified] : Imp: 60 y/o male now approximately 3 years status post 3 years revision right total hip replacement, overall stable with ongoing right iliotibial band syndrome and right lumbar radiculopathy. - Cont PT/HEP - Multimodal pain mgmt as per his pain physician - Cont annual followups with right hip XRs, earlier as needed for new or worsening hip-related symptoms

## 2024-03-24 NOTE — END OF VISIT
[FreeTextEntry3] : Documented by Geovanna Washington acting a as a scribe for Dr. Nathanael Wren. 03/22/2024.  All medical record entries made by the Scribe were at my, Dr. Nathanael Wren, direction and personally dictated by me on 03/22/2024. I have reviewed the chart and agree that the record accurately reflects my personal performance of the history, physical exam, assessment and plan. I have also personally directed, reviewed, and agreed with the chart.

## 2024-03-24 NOTE — PHYSICAL EXAM
[de-identified] : General appearance: well nourished and hydrated, pleasant, alert and oriented x 3, cooperative.  HEENT: normocephalic, EOM intact, wearing mask, external auditory canal clear.   Cardiovascular: no lower leg edema, no varicosities, dorsalis pedis pulses palpable and symmetric.   Lymphatics: no palpable lymphadenopathy, no lymphedema.   Neurologic: sensation is normal, no muscle weakness in upper or lower extremities, patella tendon reflexes present and symmetric.   Dermatologic: skin moist, warm, no rash.   Spine: cervical spine with normal lordosis and painless range of motion, thoracic spine with normal kyphosis and painless range of motion, lumbosacral spine with normal lordosis and painless range of motion.  No tenderness to palpation along midline spine and paraspinal musculature.  Sacroiliac joints nontender bilaterally. Negative SLR and crossed SLR tests bilaterally. Gait: no assistive device, stable nonantalgic gait pattern   Limb lengths: clinically right lower extremity approximately 3mm longer than left  Right hip: - Focal soft tissue swelling: none - Ecchymosis: none - Erythema: none - Wounds: well-healed anterior and posterior incisions - Tenderness: mild lateral trochanteric TTP, mild to moderate TTP throughout the entire distal extent of the iliotibial band.  - ROM:    - Flexion: 115   - Extension: 0   - Adduction: 15   - Abduction: 30   - Internal rotation in 90 degrees of hip flexion: 20   - External rotation in 90 degrees of hip flexion: 25 - LAUREN: elicits iliotibial pain and anterior distal quadriceps pain - FADIR: elicits iliotibial pain and anterior distal quadriceps pain - Nabil: negative - Stinchfield: mildly positive - Flexor power: 5/5 - Abductor power: 4+/5 [de-identified] : Repeat right hip x-rays were obtained today March 22, 2024 at [facility]. These demonstrate stable appearance of his right hip arthroplasty as compared to prior imaging without evidence of mechanical complication.   Left hip continues to demonstrate no arthritis, deformity or radiographic evidence of osteonecrosis. Overall, imaging remains stable as compared to the March 2023 x-rays.

## 2024-03-24 NOTE — HISTORY OF PRESENT ILLNESS
[de-identified] : Right hip revision of femoral head and acetabular polyethylene liner, surgical date 2/12/21.  03/22/24: 59 y/o male following up for revision right total hip replacement we last saw him a year ago at which time his chief issue was neuropathic pain arising from right lumbar radiculopathy. He reports that he did follow up with his neurosurgeon after our last phone conversation who recommended against any lumbar decompression and instead referred him to pain management. He did undergo a series of lumbar injections that he reports were approximately 70% - 80% effective at reducing the pain. However, symptoms did recur, and the patient elected not to have any further serial injections. Instead, he has been managing with a home exercise regimen and Naproxen and Gabapentin, which seem to be holding the symptoms at bay. 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    03/15/2023: 59 y/o male f/u on 2nd annual f/u s/p right hip head and liner exchange for extruded polyethylene liner performed on 2/12/21. He reports that in the time since our last meeting last year, he has had no further major injuries. He has lost 45 lbs via diet and exercise. He keeps himself active and is still working. He does continue to complain of radiating right lateral and posterolateral thigh pain which is somewhat worsened by changes in the weather and seasons, though he does not feel that this limits him in his day to day activities and typically does not require pain medication. He ambulates without the use of an assistive device and has no new complaints today.   2/28/22: Doing well. Mild lateral thigh pain that doesn't stop him from working or other normal activities. Has been trying to lose weight, down about 5lbs in the past 2 months. Had a trip and fall 2 months ago with a mild concussion for which he is following up with his PMD; denies hip trauma at that time.  2/4/21: 55y/o male referred by Dr. 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 He underwent an uncomplicated right ELIANE in 2015 (direct anterior approach) and did well postoperatively; mild thigh pain was present which did not limit his activities of daily living or his ability to work (cameraman, [employer]). However, he had a mechanical ground level fall on 2/1/21 and noted sharply increased right hip and thigh pain as well as a squeaking sensation with ambulation. Subsequent followup with Dr. Lin revealed radiographic evidence of acute acetabular polyethylene fracture vs. extrusion. He was referred to me for definitive surgical management. He has been mostly compliant with PWB on crutches since seeing Dr. Lin. Denies any other injuries or new pain since the fall.

## 2024-06-06 NOTE — PATIENT PROFILE ADULT - AGENT'S NAME
Caller: Manda Al    Relationship to patient: Self    Best call back number: [phone]    Chief complaint: UNABLE TO MAKE APPT NEEDS WEDNESDAY OR FRIDAY IF POSSIBLE    Type of visit: NEW PATIENT    Requested date: ANY     If rescheduling, when is the original appointment: 6.11.24     Additional notes:NONE          wife, Asia Clark

## 2024-08-21 NOTE — PRE-OP CHECKLIST - SPO2 (%)
Patient dropped off A1C readings 8/20 and would like to know Talia Tapia CNP's thoughts regarding recent A1C readings.  He would like a call back.  Please advise.  Thank you.   97

## 2025-03-21 ENCOUNTER — OUTPATIENT (OUTPATIENT)
Dept: OUTPATIENT SERVICES | Facility: HOSPITAL | Age: 60
LOS: 1 days | End: 2025-03-21
Payer: COMMERCIAL

## 2025-03-21 ENCOUNTER — APPOINTMENT (OUTPATIENT)
Dept: ORTHOPEDIC SURGERY | Facility: CLINIC | Age: 60
End: 2025-03-21
Payer: COMMERCIAL

## 2025-03-21 ENCOUNTER — RESULT REVIEW (OUTPATIENT)
Age: 60
End: 2025-03-21

## 2025-03-21 VITALS
WEIGHT: 212 LBS | OXYGEN SATURATION: 97 % | SYSTOLIC BLOOD PRESSURE: 129 MMHG | BODY MASS INDEX: 30.35 KG/M2 | HEART RATE: 56 BPM | DIASTOLIC BLOOD PRESSURE: 80 MMHG | HEIGHT: 70 IN

## 2025-03-21 DIAGNOSIS — Z96.641 AFTERCARE FOLLOWING JOINT REPLACEMENT SURGERY: ICD-10-CM

## 2025-03-21 DIAGNOSIS — Z41.9 ENCOUNTER FOR PROCEDURE FOR PURPOSES OTHER THAN REMEDYING HEALTH STATE, UNSPECIFIED: Chronic | ICD-10-CM

## 2025-03-21 DIAGNOSIS — T85.848A PAIN DUE TO OTHER INTERNAL PROSTHETIC DEVICES, IMPLANTS AND GRAFTS, INITIAL ENCOUNTER: ICD-10-CM

## 2025-03-21 DIAGNOSIS — Z47.1 AFTERCARE FOLLOWING JOINT REPLACEMENT SURGERY: ICD-10-CM

## 2025-03-21 DIAGNOSIS — T84.010A BROKEN INTERNAL RIGHT HIP PROSTHESIS, INITIAL ENCOUNTER: ICD-10-CM

## 2025-03-21 PROCEDURE — 73502 X-RAY EXAM HIP UNI 2-3 VIEWS: CPT

## 2025-03-21 PROCEDURE — 99213 OFFICE O/P EST LOW 20 MIN: CPT

## 2025-03-21 PROCEDURE — 73502 X-RAY EXAM HIP UNI 2-3 VIEWS: CPT | Mod: 26,RT
